# Patient Record
Sex: FEMALE | Race: WHITE | Employment: FULL TIME | URBAN - METROPOLITAN AREA
[De-identification: names, ages, dates, MRNs, and addresses within clinical notes are randomized per-mention and may not be internally consistent; named-entity substitution may affect disease eponyms.]

---

## 2018-05-21 PROCEDURE — 82570 ASSAY OF URINE CREATININE: CPT | Performed by: FAMILY MEDICINE

## 2018-05-21 PROCEDURE — 82043 UR ALBUMIN QUANTITATIVE: CPT | Performed by: FAMILY MEDICINE

## 2018-06-27 PROCEDURE — 88175 CYTOPATH C/V AUTO FLUID REDO: CPT | Performed by: OBSTETRICS & GYNECOLOGY

## 2018-06-27 PROCEDURE — 87624 HPV HI-RISK TYP POOLED RSLT: CPT | Performed by: OBSTETRICS & GYNECOLOGY

## 2018-09-05 PROBLEM — M25.551 HIP PAIN, BILATERAL: Status: ACTIVE | Noted: 2018-09-05

## 2018-09-05 PROBLEM — M54.50 ACUTE BILATERAL LOW BACK PAIN WITHOUT SCIATICA: Status: ACTIVE | Noted: 2018-09-05

## 2018-09-05 PROBLEM — M25.552 HIP PAIN, BILATERAL: Status: ACTIVE | Noted: 2018-09-05

## 2019-01-25 PROBLEM — I77.1 TORTUOUS AORTA (HCC): Status: ACTIVE | Noted: 2019-01-25

## 2019-01-28 PROBLEM — M54.50 CHRONIC BILATERAL LOW BACK PAIN WITHOUT SCIATICA: Status: ACTIVE | Noted: 2018-09-05

## 2019-01-28 PROBLEM — G89.29 CHRONIC BILATERAL LOW BACK PAIN WITHOUT SCIATICA: Status: ACTIVE | Noted: 2018-09-05

## 2019-04-25 PROCEDURE — 86256 FLUORESCENT ANTIBODY TITER: CPT | Performed by: NURSE PRACTITIONER

## 2019-04-25 PROCEDURE — 82784 ASSAY IGA/IGD/IGG/IGM EACH: CPT | Performed by: NURSE PRACTITIONER

## 2019-05-08 ENCOUNTER — ANESTHESIA EVENT (OUTPATIENT)
Dept: ENDOSCOPY | Facility: HOSPITAL | Age: 64
End: 2019-05-08
Payer: MEDICARE

## 2019-05-08 ENCOUNTER — HOSPITAL ENCOUNTER (OUTPATIENT)
Facility: HOSPITAL | Age: 64
Setting detail: HOSPITAL OUTPATIENT SURGERY
Discharge: HOME | End: 2019-05-08
Attending: INTERNAL MEDICINE | Admitting: INTERNAL MEDICINE
Payer: MEDICARE

## 2019-05-08 ENCOUNTER — ANESTHESIA (OUTPATIENT)
Dept: ENDOSCOPY | Facility: HOSPITAL | Age: 64
End: 2019-05-08
Payer: MEDICARE

## 2019-05-08 DIAGNOSIS — R13.10 DYSPHAGIA, UNSPECIFIED TYPE: ICD-10-CM

## 2019-05-08 DIAGNOSIS — Z12.11 COLON CANCER SCREENING: ICD-10-CM

## 2019-05-08 PROCEDURE — 0DBE8ZX EXCISION OF LARGE INTESTINE, VIA NATURAL OR ARTIFICIAL OPENING ENDOSCOPIC, DIAGNOSTIC: ICD-10-PCS | Performed by: INTERNAL MEDICINE

## 2019-05-08 PROCEDURE — 88312 SPECIAL STAINS GROUP 1: CPT | Performed by: INTERNAL MEDICINE

## 2019-05-08 PROCEDURE — 88305 TISSUE EXAM BY PATHOLOGIST: CPT | Performed by: INTERNAL MEDICINE

## 2019-05-08 PROCEDURE — 0DB68ZX EXCISION OF STOMACH, VIA NATURAL OR ARTIFICIAL OPENING ENDOSCOPIC, DIAGNOSTIC: ICD-10-PCS | Performed by: INTERNAL MEDICINE

## 2019-05-08 PROCEDURE — 0DB58ZX EXCISION OF ESOPHAGUS, VIA NATURAL OR ARTIFICIAL OPENING ENDOSCOPIC, DIAGNOSTIC: ICD-10-PCS | Performed by: INTERNAL MEDICINE

## 2019-05-08 PROCEDURE — 0DB98ZX EXCISION OF DUODENUM, VIA NATURAL OR ARTIFICIAL OPENING ENDOSCOPIC, DIAGNOSTIC: ICD-10-PCS | Performed by: INTERNAL MEDICINE

## 2019-05-08 RX ORDER — ONDANSETRON 2 MG/ML
4 INJECTION INTRAMUSCULAR; INTRAVENOUS ONCE AS NEEDED
Status: CANCELLED | OUTPATIENT
Start: 2019-05-08 | End: 2019-05-08

## 2019-05-08 RX ORDER — SODIUM CHLORIDE, SODIUM LACTATE, POTASSIUM CHLORIDE, CALCIUM CHLORIDE 600; 310; 30; 20 MG/100ML; MG/100ML; MG/100ML; MG/100ML
INJECTION, SOLUTION INTRAVENOUS CONTINUOUS
Status: CANCELLED | OUTPATIENT
Start: 2019-05-08

## 2019-05-08 RX ORDER — LIDOCAINE HYDROCHLORIDE 10 MG/ML
INJECTION, SOLUTION EPIDURAL; INFILTRATION; INTRACAUDAL; PERINEURAL AS NEEDED
Status: DISCONTINUED | OUTPATIENT
Start: 2019-05-08 | End: 2019-05-08 | Stop reason: SURG

## 2019-05-08 RX ORDER — SODIUM CHLORIDE, SODIUM LACTATE, POTASSIUM CHLORIDE, CALCIUM CHLORIDE 600; 310; 30; 20 MG/100ML; MG/100ML; MG/100ML; MG/100ML
INJECTION, SOLUTION INTRAVENOUS CONTINUOUS
Status: DISCONTINUED | OUTPATIENT
Start: 2019-05-08 | End: 2019-05-08

## 2019-05-08 RX ORDER — NALOXONE HYDROCHLORIDE 0.4 MG/ML
80 INJECTION, SOLUTION INTRAMUSCULAR; INTRAVENOUS; SUBCUTANEOUS AS NEEDED
Status: CANCELLED | OUTPATIENT
Start: 2019-05-08 | End: 2019-05-08

## 2019-05-08 RX ADMIN — LIDOCAINE HYDROCHLORIDE 80 MG: 10 INJECTION, SOLUTION EPIDURAL; INFILTRATION; INTRACAUDAL; PERINEURAL at 13:51:00

## 2019-05-08 RX ADMIN — SODIUM CHLORIDE, SODIUM LACTATE, POTASSIUM CHLORIDE, CALCIUM CHLORIDE: 600; 310; 30; 20 INJECTION, SOLUTION INTRAVENOUS at 13:43:00

## 2019-05-08 RX ADMIN — SODIUM CHLORIDE, SODIUM LACTATE, POTASSIUM CHLORIDE, CALCIUM CHLORIDE: 600; 310; 30; 20 INJECTION, SOLUTION INTRAVENOUS at 14:27:00

## 2019-05-08 NOTE — ANESTHESIA PREPROCEDURE EVALUATION
Anesthesia PreOp Note    HPI:     Verona Mendoza is a 70year old female who presents for preoperative consultation requested by: Cydney Gonzalez MD    Date of Surgery: 5/8/2019    Procedure(s):  COLONOSCOPY  ESOPHAGOGASTRODUODENOSCOPY (EGD)  Indication Past Medical History:   Diagnosis Date   • Allergic rhinitis 1/2/2016   • Anxiety 6/16/2011   • Back problem    • Depression, unspecified depression type 12/23/2016   • Dyslipidemia 6/16/2011   • Family history of colonic polyps     father in 76s   • High losartan 100 MG Oral Tab Take 1 tablet (100 mg total) by mouth once daily. Disp: 90 tablet Rfl: 0 5/7/2019 at 0800   AmLODIPine Besylate 10 MG Oral Tab Take 1 tablet (10 mg total) by mouth daily.  Disp: 90 tablet Rfl: 0 5/7/2019 at 2100   ALPRAZolam 0.25 MG Financial resource strain: Not on file      Food insecurity:        Worry: Not on file        Inability: Not on file      Transportation needs:        Medical: Not on file        Non-medical: Not on file    Tobacco Use      Smoking status: Former Smoke TM distance: >3 FB  Neck ROM: full  Dental - normal exam         Pulmonary - normal exam   (+) sleep apnea on CPAP,   Cardiovascular - normal exam  (+) hypertension well controlled,     Neuro/Psych    (+) neuromuscular disease,     GI/Hepatic/Renal    (+)

## 2019-05-08 NOTE — OPERATIVE REPORT
PATIENT NAME: Richard Chaidez  MRN: [id number]  DATE OF OPERATION: 5/8/2019  PREOPERATIVE DIAGNOSIS:   1. Dysphagia for solids. History of Schatzki ring dilated many years ago. 2. Average risk for colorectal cancer  3.  Change in bowel habits  POSTOPERA abnormalities. Retroflexion revealed a normal cardia and fundus. Linear erythema was seen in the antrum and biopsies were taken. 3. Normal duodenum to the second portion with no ulcers or masses seen.   Random biopsies were taken from the second portion d

## 2019-05-08 NOTE — ANESTHESIA POSTPROCEDURE EVALUATION
Patient:  Mitra Pugh Datavs    Procedure Summary     Date:  05/08/19 Room / Location:  [facility] ENDOSCOPY 01 / [facility] ENDOSCOPY    Anesthesia Start:  5048 Anesthesia Stop:  3110    Procedures:       COLONOSCOPY (N/A )      ESOPHAGOGASTRODUODENOSCOPY (EGD) (N/A ) [place]

## 2019-05-09 VITALS
SYSTOLIC BLOOD PRESSURE: 140 MMHG | WEIGHT: 245 LBS | BODY MASS INDEX: 43.41 KG/M2 | RESPIRATION RATE: 18 BRPM | OXYGEN SATURATION: 97 % | DIASTOLIC BLOOD PRESSURE: 72 MMHG | HEIGHT: 63 IN | HEART RATE: 60 BPM

## 2019-05-09 NOTE — PROGRESS NOTES
5/9/2019  Sarah Smith Datavs  [address]  [place] [facility] [facility] [address]    Dear Sarah Smith,       Here are the biopsy/pathology findings from your recent EGD (upper endoscopy) and colonoscopy:     The biopsy/pathology findings from your upper endoscopy tolu

## 2020-02-12 PROBLEM — M35.01 KERATITIS SICCA, BILATERAL (HCC): Status: ACTIVE | Noted: 2020-02-12

## 2020-03-23 PROBLEM — N18.30 CKD (CHRONIC KIDNEY DISEASE) STAGE 3, GFR 30-59 ML/MIN (HCC): Status: ACTIVE | Noted: 2020-03-23

## 2020-07-09 PROBLEM — D32.9 MENINGIOMA (HCC): Status: ACTIVE | Noted: 2020-07-09

## 2020-07-17 PROBLEM — F32.4 MAJOR DEPRESSIVE DISORDER WITH SINGLE EPISODE, IN PARTIAL REMISSION (HCC): Status: ACTIVE | Noted: 2020-07-17

## 2021-01-27 PROBLEM — H26.9 CATARACT OF BOTH EYES, UNSPECIFIED CATARACT TYPE: Status: ACTIVE | Noted: 2021-01-27

## 2021-01-27 PROBLEM — R73.01 IMPAIRED FASTING GLUCOSE: Status: ACTIVE | Noted: 2021-01-27

## 2021-01-27 PROBLEM — M17.0 PRIMARY OSTEOARTHRITIS OF BOTH KNEES: Status: ACTIVE | Noted: 2021-01-27

## 2021-10-13 PROBLEM — H04.123 DRY EYE SYNDROME, BILATERAL: Status: ACTIVE | Noted: 2021-10-13

## 2021-10-13 PROBLEM — M35.01 KERATITIS SICCA, BILATERAL (HCC): Status: RESOLVED | Noted: 2020-02-12 | Resolved: 2021-10-13

## 2021-10-14 PROBLEM — H16.9 UNSPECIFIED KERATITIS: Status: ACTIVE | Noted: 2021-10-14

## 2022-01-24 PROBLEM — R41.840 COGNITIVE ATTENTION DEFICIT: Status: ACTIVE | Noted: 2022-01-24

## (undated) DEVICE — Device: Brand: CUSTOM PROCEDURE KIT

## (undated) DEVICE — LINE MNTR ADLT SET O2 INTMD

## (undated) DEVICE — CONMED SCOPE SAVER BITE BLOCK, 20X27 MM: Brand: SCOPE SAVER

## (undated) DEVICE — MEDI-VAC NON-CONDUCTIVE SUCTION TUBING 6MM X 1.8M (6FT.) L: Brand: CARDINAL HEALTH

## (undated) DEVICE — Device: Brand: DEFENDO AIR/WATER/SUCTION AND BIOPSY VALVE

## (undated) DEVICE — 35 ML SYRINGE REGULAR TIP: Brand: MONOJECT

## (undated) DEVICE — FORCEP RADIAL JAW 4

## (undated) DEVICE — Device

## (undated) NOTE — LETTER
[address] [place] [address]  Authorization for Invasive Procedures  1.  I hereby authorize Dr. Jazzy Godoy , my physician and whomever may be designated as the doctor's assistant, to perform the following operation and/or procedure:  C operations or procedures to be performed for the purposes of advancing medicine, science, and/or education, provided my identity is not revealed. If the procedure has been videotaped, the physician/surgeon will obtain the original videotape.  [facility]  My signature below affirms that prior to the time of the procedure, I have explained to the patient and/or her legal representative, the risks and benefits involved in the proposed treatment and any reasonable alternative to the proposed treatme